# Patient Record
Sex: MALE | Race: BLACK OR AFRICAN AMERICAN | NOT HISPANIC OR LATINO | URBAN - METROPOLITAN AREA
[De-identification: names, ages, dates, MRNs, and addresses within clinical notes are randomized per-mention and may not be internally consistent; named-entity substitution may affect disease eponyms.]

---

## 2021-07-13 ENCOUNTER — OUT OF OFFICE VISIT (OUTPATIENT)
Dept: URBAN - METROPOLITAN AREA MEDICAL CENTER 12 | Facility: MEDICAL CENTER | Age: 71
End: 2021-07-13
Payer: MEDICARE

## 2021-07-13 DIAGNOSIS — K92.1 BLACK STOOL: ICD-10-CM

## 2021-07-13 DIAGNOSIS — K92.0 BLOODY EMESIS: ICD-10-CM

## 2021-07-13 DIAGNOSIS — Z79.1 ENCNTR LONG-TERM NSAID USE: ICD-10-CM

## 2021-07-13 DIAGNOSIS — Z87.11 H/O PEPTIC ULCER: ICD-10-CM

## 2021-07-13 DIAGNOSIS — D62 ACUTE BLOOD LOSS ANEMIA: ICD-10-CM

## 2021-07-13 DIAGNOSIS — K28.9 ANASTOMOTIC ULCER: ICD-10-CM

## 2021-07-13 DIAGNOSIS — K28.7 CHRONIC GASTROJEJUNAL ULCER WITHOUT HEMORRHAGE OR PERFORATION: ICD-10-CM

## 2021-07-13 PROCEDURE — G8427 DOCREV CUR MEDS BY ELIG CLIN: HCPCS | Performed by: INTERNAL MEDICINE

## 2021-07-13 PROCEDURE — 99233 SBSQ HOSP IP/OBS HIGH 50: CPT | Performed by: INTERNAL MEDICINE

## 2021-07-13 PROCEDURE — 99222 1ST HOSP IP/OBS MODERATE 55: CPT | Performed by: INTERNAL MEDICINE

## 2021-07-13 PROCEDURE — 99232 SBSQ HOSP IP/OBS MODERATE 35: CPT | Performed by: INTERNAL MEDICINE

## 2021-07-13 PROCEDURE — 43239 EGD BIOPSY SINGLE/MULTIPLE: CPT | Performed by: INTERNAL MEDICINE

## 2021-07-15 ENCOUNTER — OUT OF OFFICE VISIT (OUTPATIENT)
Dept: URBAN - METROPOLITAN AREA MEDICAL CENTER 12 | Facility: MEDICAL CENTER | Age: 71
End: 2021-07-15
Payer: MEDICARE

## 2021-07-15 DIAGNOSIS — K92.1 ACUTE MELENA: ICD-10-CM

## 2021-07-15 PROCEDURE — 45378 DIAGNOSTIC COLONOSCOPY: CPT | Performed by: INTERNAL MEDICINE

## 2021-10-21 ENCOUNTER — OFFICE VISIT (OUTPATIENT)
Dept: URBAN - METROPOLITAN AREA CLINIC 17 | Facility: CLINIC | Age: 71
End: 2021-10-21
Payer: MEDICARE

## 2021-10-21 DIAGNOSIS — K25.3 ACUTE GASTRIC ULCER, UNSPECIFIED WHETHER GASTRIC ULCER HEMORRHAGE OR PERFORATION PRESENT: ICD-10-CM

## 2021-10-21 DIAGNOSIS — K57.90 DIVERTICULOSIS: ICD-10-CM

## 2021-10-21 DIAGNOSIS — K70.30 ALCOHOLIC CIRRHOSIS OF LIVER WITHOUT ASCITES: ICD-10-CM

## 2021-10-21 DIAGNOSIS — K92.2 GASTROINTESTINAL HEMORRHAGE, UNSPECIFIED GASTROINTESTINAL HEMORRHAGE TYPE: ICD-10-CM

## 2021-10-21 PROCEDURE — 99214 OFFICE O/P EST MOD 30 MIN: CPT | Performed by: INTERNAL MEDICINE

## 2021-10-21 RX ORDER — HYDROCHLOROTHIAZIDE 25 MG/1
TABLET ORAL
Qty: 0 | Refills: 0 | Status: ON HOLD | COMMUNITY
Start: 1900-01-01

## 2021-10-21 RX ORDER — PANTOPRAZOLE SODIUM 40 MG/1
TABLET, DELAYED RELEASE ORAL
Qty: 0 | Refills: 0 | Status: ON HOLD | COMMUNITY
Start: 1900-01-01

## 2021-10-21 RX ORDER — ATORVASTATIN CALCIUM 20 MG/1
TABLET, FILM COATED ORAL
Qty: 0 | Refills: 0 | Status: ACTIVE | COMMUNITY
Start: 1900-01-01

## 2021-10-21 RX ORDER — METFORMIN HYDROCHLORIDE 500 MG/1
TABLET, COATED ORAL
Qty: 0 | Refills: 0 | Status: ON HOLD | COMMUNITY
Start: 1900-01-01

## 2021-10-21 RX ORDER — LOSARTAN POTASSIUM 100 MG/1
TAKE 1 TABLET (100 MG) BY ORAL ROUTE ONCE DAILY TABLET, FILM COATED ORAL 1
Qty: 0 | Refills: 0 | Status: ON HOLD | COMMUNITY
Start: 1900-01-01

## 2021-10-21 NOTE — HPI-TODAY'S VISIT:
This is a 71-year-old gentleman here for follow-up of an [facility] hospitalization.  The patient was admitted to [facility] July 13, 2021 and discharged July 19, 2021 after presenting with anemia.  He was actually admitted to the ICU and was placed on pressors.  He was transfused a total of 2 units of packed red blood cells.  A colonoscopy demonstrated clots throughout the colon as well as diverticular disease.  An upper endoscopy revealed a 2 cm hiatal hernia.  There was also evidence of a gastrojejunostomy.  A 1 cm ulceration with flat red spot was noted.  The patient has a past medical history significant for alcohol related liver cirrhosis.  He has not had an alcoholic beverage in many years and does not follow up with a hepatologist. 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 He's not add any to get to test the bleeding since hospital discharge. Labs revere done  at his PCPs office over two months ago.

## 2021-12-02 ENCOUNTER — OFFICE VISIT (OUTPATIENT)
Dept: URBAN - METROPOLITAN AREA CLINIC 17 | Facility: CLINIC | Age: 71
End: 2021-12-02
Payer: MEDICARE

## 2021-12-02 ENCOUNTER — DASHBOARD ENCOUNTERS (OUTPATIENT)
Age: 71
End: 2021-12-02

## 2021-12-02 VITALS
TEMPERATURE: 98.4 F | WEIGHT: 150 LBS | SYSTOLIC BLOOD PRESSURE: 118 MMHG | BODY MASS INDEX: 21.47 KG/M2 | HEART RATE: 83 BPM | HEIGHT: 70 IN | DIASTOLIC BLOOD PRESSURE: 73 MMHG

## 2021-12-02 DIAGNOSIS — K92.2 GASTROINTESTINAL HEMORRHAGE, UNSPECIFIED GASTROINTESTINAL HEMORRHAGE TYPE: ICD-10-CM

## 2021-12-02 DIAGNOSIS — K70.30 ALCOHOLIC CIRRHOSIS OF LIVER WITHOUT ASCITES: ICD-10-CM

## 2021-12-02 DIAGNOSIS — K57.90 DIVERTICULOSIS: ICD-10-CM

## 2021-12-02 DIAGNOSIS — K25.3 ACUTE GASTRIC ULCER, UNSPECIFIED WHETHER GASTRIC ULCER HEMORRHAGE OR PERFORATION PRESENT: ICD-10-CM

## 2021-12-02 DIAGNOSIS — R01.1 HEART MURMUR: ICD-10-CM

## 2021-12-02 PROBLEM — 397881000: Status: ACTIVE | Noted: 2021-10-21

## 2021-12-02 PROBLEM — 420054005: Status: ACTIVE | Noted: 2021-10-21

## 2021-12-02 PROCEDURE — 99213 OFFICE O/P EST LOW 20 MIN: CPT | Performed by: INTERNAL MEDICINE

## 2021-12-02 RX ORDER — METFORMIN HYDROCHLORIDE 500 MG/1
TABLET, COATED ORAL
Qty: 0 | Refills: 0 | Status: ACTIVE | COMMUNITY
Start: 1900-01-01

## 2021-12-02 RX ORDER — ATORVASTATIN CALCIUM 20 MG/1
TABLET, FILM COATED ORAL
Qty: 0 | Refills: 0 | Status: ACTIVE | COMMUNITY
Start: 1900-01-01

## 2021-12-02 RX ORDER — PANTOPRAZOLE SODIUM 40 MG/1
TABLET, DELAYED RELEASE ORAL
Qty: 0 | Refills: 0 | Status: ACTIVE | COMMUNITY
Start: 1900-01-01

## 2021-12-02 RX ORDER — LOSARTAN POTASSIUM 100 MG/1
TAKE 1 TABLET (100 MG) BY ORAL ROUTE ONCE DAILY TABLET, FILM COATED ORAL 1
Qty: 0 | Refills: 0 | Status: ACTIVE | COMMUNITY
Start: 1900-01-01

## 2021-12-02 RX ORDER — HYDROCHLOROTHIAZIDE 25 MG/1
TABLET ORAL
Qty: 0 | Refills: 0 | Status: ACTIVE | COMMUNITY
Start: 1900-01-01

## 2021-12-02 NOTE — HPI-TODAY'S VISIT:
(October 21,2021) This is a 71-year-old gentleman here for follow-up of an [facility] hospitalization.  The patient was admitted to [facility] July 13, 2021 and discharged July 19, 2021 after presenting with anemia.  He was actually admitted to the ICU and was placed on pressors.  He was transfused a total of 2 units of packed red blood cells.  A colonoscopy demonstrated clots throughout the colon as well as diverticular disease.  An upper endoscopy revealed a 2 cm hiatal hernia.  There was also evidence of a gastrojejunostomy.  A 1 cm ulceration with flat red spot was noted.  The patient has a past medical history significant for alcohol related liver cirrhosis.  He has not had an alcoholic beverage in many years and does not follow up with a hepatologist. 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 He's not add any to get to test the bleeding since hospital discharge. Labs revere done  at his PCPs office over two months ago.  Today: December2, 2021 The patient is here for follow up.  Labs ordered last visit october 2021 were not done.  He has no GI complaints at this time.  He utilizes a walker due to imbalance.

## 2021-12-03 LAB
A/G RATIO: 1.8
AFP, SERUM, TUMOR MARKER: 3.4
ALBUMIN: 4.4
ALKALINE PHOSPHATASE: 65
ALT (SGPT): 19
AST (SGOT): 23
BILIRUBIN, TOTAL: 0.7
BUN/CREATININE RATIO: 13
BUN: 11
CALCIUM: 10
CARBON DIOXIDE, TOTAL: 26
CHLORIDE: 102
CREATININE: 0.87
EGFR IF AFRICN AM: 100
EGFR IF NONAFRICN AM: 86
GLOBULIN, TOTAL: 2.5
GLUCOSE: 76
HEMATOCRIT: 43.6
HEMOGLOBIN: 13.5
INR: 1.1
IRON: 79
MCH: 24.8
MCHC: 31
MCV: 80
NRBC: (no result)
PLATELETS: 213
POTASSIUM: 4.5
PROTEIN, TOTAL: 6.9
PROTHROMBIN TIME: 11.1
RBC: 5.44
RDW: 15.5
SODIUM: 142
WBC: 5.3